# Patient Record
Sex: FEMALE | Race: WHITE | Employment: FULL TIME | URBAN - METROPOLITAN AREA
[De-identification: names, ages, dates, MRNs, and addresses within clinical notes are randomized per-mention and may not be internally consistent; named-entity substitution may affect disease eponyms.]

---
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
## 2025-05-19 ENCOUNTER — OFFICE VISIT (OUTPATIENT)
Dept: ORTHOPEDIC SURGERY | Age: 20
End: 2025-05-19
Payer: COMMERCIAL

## 2025-05-19 VITALS — HEIGHT: 64 IN | BODY MASS INDEX: 22.71 KG/M2 | WEIGHT: 133 LBS

## 2025-05-19 DIAGNOSIS — M79.661 PAIN IN RIGHT SHIN: Primary | ICD-10-CM

## 2025-05-19 PROCEDURE — 99214 OFFICE O/P EST MOD 30 MIN: CPT | Performed by: ORTHOPAEDIC SURGERY

## 2025-05-19 NOTE — PROGRESS NOTES
Sommer Rivera is seen today for right leg pain.  I saw her for a left tibial stress fracture in 2022.  She had a right tibial stress injury treated by Dr. Forman in 2023.      Today she reports that she had no trouble in cross-country season of 2024.  The beginning of April of this year during track season she began having right shin pain again.  She has been in a boot for the last 2 weeks and that has helped but she can get pain that still 4 out of 10.    She just finished her sophomore year at [employer].      History: Patient's relevant past family, medical, and social history are reviewed as part of today's visit. ROS of pertinent positives and negatives as above; otherwise negative.    General Exam:    Vitals: Height 1.626 m (5' 4\"), weight 60.3 kg (133 lb).  Constitutional: Patient is adequately groomed with no evidence of malnutrition  Mental Status: The patient is oriented to time, place and person.  The patient's mood and affect are appropriate.  Gait:  Patient walks with normal gait and station.  Lymphatic: The lymphatic examination bilaterally reveals all areas to be without enlargement or induration.  Vascular: Examination reveals no swelling or calf tenderness.  Peripheral pulses are palpable and 2+.  Neurological: The patient has good coordination.  There is no weakness or sensory deficit.    Skin:    Head/Neck: inspection reveals no rashes, ulcerations or lesions.  Trunk:  inspection reveals no rashes, ulcerations or lesions.  Right Lower Extremity: inspection reveals no rashes, ulcerations or lesions.  Left Lower Extremity: inspection reveals no rashes, ulcerations or lesions.    Left shin is normal.    She has moderate to intense pain anterior medial at the midshaft of the tibia.  She has no fibular pain.  Calf is soft.      She has no pain with range of motion at the ankle.    2 view x-rays right shin obtained today in the office and interpreted by me demonstrate: No acute bony

## 2025-06-03 ENCOUNTER — OFFICE VISIT (OUTPATIENT)
Dept: ORTHOPEDIC SURGERY | Age: 20
End: 2025-06-03
Payer: COMMERCIAL

## 2025-06-03 VITALS — WEIGHT: 133 LBS | HEIGHT: 64 IN | BODY MASS INDEX: 22.71 KG/M2

## 2025-06-03 DIAGNOSIS — M79.661 PAIN IN RIGHT SHIN: Primary | ICD-10-CM

## 2025-06-03 PROCEDURE — 99212 OFFICE O/P EST SF 10 MIN: CPT | Performed by: ORTHOPAEDIC SURGERY

## 2025-06-03 NOTE — PROGRESS NOTES
Sommer Rivera is seen today to follow-up her right leg.  She has been in the boot.  We started calcium and vitamin D.  Today she reports significant improvement.  If she has any pain is only 1 out of 10.    General Exam:    Vitals: Height 1.626 m (5' 4\"), weight 60.3 kg (133 lb).  Constitutional: Patient is adequately groomed with no evidence of malnutrition  Mental Status: The patient is oriented to time, place and person.  The patient's mood and affect are appropriate.    Right shin has no posterior medial pain but some mild pain anteriorly over the tibia.  She has no swelling.  Calf is soft.  Neurologic and vascular exams are normal.    Assessment: Improving right tibial stress injury versus shinsplints.    Plan: She will begin to wean from the boot.  Will start therapy with consideration of dry needling and use of the antigravity treadmill to allow her to get back to full training.  Follow-up with me in 2 weeks.

## 2025-06-09 ENCOUNTER — HOSPITAL ENCOUNTER (OUTPATIENT)
Dept: PHYSICAL THERAPY | Age: 20
Setting detail: THERAPIES SERIES
Discharge: HOME | End: 2025-06-09
Attending: ORTHOPAEDIC SURGERY
Payer: COMMERCIAL

## 2025-06-09 DIAGNOSIS — M79.661 PAIN IN RIGHT SHIN: Primary | ICD-10-CM

## 2025-06-09 PROCEDURE — 97110 THERAPEUTIC EXERCISES: CPT

## 2025-06-09 PROCEDURE — 97112 NEUROMUSCULAR REEDUCATION: CPT

## 2025-06-09 PROCEDURE — 97161 PT EVAL LOW COMPLEX 20 MIN: CPT

## 2025-06-09 NOTE — PLAN OF CARE
n/a  Other:    Red Flags:  None    Suicide Screening:   The patient did not verbalize a primary behavioral concern, suicidal ideation, suicidal intent, or demonstrate suicidal behaviors.    Preferred Language for Healthcare:   [x] English       [] other:    SUBJECTIVE EXAMINATION     Patient stated complaint: R shin pain. In April, she noticed shin pain begin after running and stayed pretty constant. She followed up with [employer] at school after the season ended and she was placed in a boot. She followed up with MD when she came in town from college. She does not use her boot while in her home, but uses it any time she leaves home.   She has a history of two stress injuries/fractures, which took her at least a couple of months to return from.    At college, she participates in cross county and track (800m, 1500m, and 5k)       Chart review:  right leg pain.  I saw her for a left tibial stress fracture in 2022.  She had a right tibial stress injury treated by Dr. Forman in 2023. Today she reports that she had no trouble in cross-country season of 2024.  The beginning of April of this year during [employer] season she began having right shin pain again.  She has been in a boot for the last 2 weeks and that has helped but she can get pain that still 4 out of 10. She will continue with her boot. She will start calcium with vitamin D supplement and we made a recommendation about that. Follow-up with me in 2 weeks. Assessment: Improving right tibial stress injury versus shinsplints. Plan: She will begin to wean from the boot.  Will start therapy with consideration of dry needling and use of the antigravity treadmill to allow her to get back to full training.  Follow-up with me in 2 weeks.       Test used Initial score  6/9/25 06/09/2025   Pain Summary VAS 4/10 0/10   Functional questionnaire LEFS 35% Deficit     Other:              Pain:  Pain location: R tibia   Patient describes pain to be aching  Pain decreases with: Resting and

## 2025-06-12 ENCOUNTER — HOSPITAL ENCOUNTER (OUTPATIENT)
Dept: PHYSICAL THERAPY | Age: 20
Setting detail: THERAPIES SERIES
Discharge: HOME | End: 2025-06-12
Attending: ORTHOPAEDIC SURGERY
Payer: COMMERCIAL

## 2025-06-12 PROCEDURE — 97110 THERAPEUTIC EXERCISES: CPT

## 2025-06-12 PROCEDURE — 97112 NEUROMUSCULAR REEDUCATION: CPT

## 2025-06-12 NOTE — FLOWSHEET NOTE
or restriction.   [] Progressing: [] Met: [] Not Met: [] Adjusted  Patient will be able to jog 20 minutes without increased symptoms or restriction.   [] Progressing: [] Met: [] Not Met: [] Adjusted       Overall Progression Towards Functional goals/ Treatment Progress Update:  [] Patient is progressing as expected towards functional goals listed.    [] Progression is slowed due to complexities/Impairments listed.  [] Progression has been slowed due to co-morbidities.  [x] Plan just implemented, too soon (<30days) to assess goals progression   [] Goals require adjustment due to lack of progress  [] Patient is not progressing as expected and requires additional follow up with physician  [] Other:     TREATMENT PLAN     Frequency/Duration: 1-2x/week for  6-8  weeks for the following treatment interventions:    Interventions:  Therapeutic Exercise (06985) including: strength training, ROM, and functional mobility  Therapeutic Activities (83751) including: functional mobility training and education.  Neuromuscular Re-education (29681) activation and proprioception, including postural re-education.    Manual Therapy (58576) as indicated to include: Passive Range of Motion, Gr I-IV mobilizations, Soft Tissue Mobilization, Dry Needling/IASTM, Trigger Point Release, and Myofascial Release  Modalities as needed that may include: Cryotherapy and Electrical Stimulation  Patient education on joint protection, postural re-education, activity modification, and progression of HEP    Plan: POC initiated as per evaluation    Electronically Signed by Beatriz Royal PT  Date: 06/12/2025   Note: Portions of this note have been templated and/or copied from initial evaluation, reassessments and prior notes for documentation efficiency.  Note: If patient does not return for scheduled/recommended follow up visits, this note will serve as a discharge from care along with the most recent update on progress.

## 2025-06-16 ENCOUNTER — HOSPITAL ENCOUNTER (OUTPATIENT)
Dept: PHYSICAL THERAPY | Age: 20
Setting detail: THERAPIES SERIES
Discharge: HOME | End: 2025-06-16
Attending: ORTHOPAEDIC SURGERY
Payer: COMMERCIAL

## 2025-06-16 PROCEDURE — 97112 NEUROMUSCULAR REEDUCATION: CPT

## 2025-06-16 PROCEDURE — 97110 THERAPEUTIC EXERCISES: CPT

## 2025-06-16 PROCEDURE — 97530 THERAPEUTIC ACTIVITIES: CPT

## 2025-06-16 NOTE — FLOWSHEET NOTE
[facility] - Outpatient Rehabilitation and Therapy: [address], [place] [address] office: [phone] fax: [phone]       Physical Therapy: TREATMENT/PROGRESS NOTE   Patient: Sommer Rivera (19 y.o. female)   Examination Date: 2025   :  2005 MRN: [id number]   Visit #: 3   Insurance Allowable Auth Needed   60 VPCY []Yes    [x]No    Insurance: Payor: [place] BC[place] / Plan: [place] / Product Type: *No Product type* /   Insurance ID: [id number] - ([place] BCBS)  Secondary Insurance (if applicable): [employer] CLAIM SERVICES   Treatment Diagnosis:     ICD-10-CM    1. Pain in right shin  M79.661          Medical Diagnosis:  Pain in right shin [M79.661]   Referring Physician: Andrés Arellano MD  PCP: [facility] [place] (Inactive)     Plan of care signed (Y/N): Y    Date of Patient follow up with Physician:      Plan of Care Report: EVAL   POC update due: (10 visits /OR AUTH LIMITS, whichever is less)  2025                                             Medical History:  Comorbidities / Relevant Medical History: hx of BLE stress injuries                                          Precautions/ Contra-indications:           Latex allergy:  NO  Pacemaker:    NO  Contraindications for Manipulation: None  Date of Surgery: n/a  Other:    Red Flags:  None    Suicide Screening:   The patient did not verbalize a primary behavioral concern, suicidal ideation, suicidal intent, or demonstrate suicidal behaviors.    Preferred Language for Healthcare:   [x] English       [] other:    SUBJECTIVE EXAMINATION     Patient stated complaint: Patient states the shin feels fine, no adverse effects after the last session. She continues to wear a gym shoe for community ambulation with no presentation of symptoms.        Test used Initial score  2025   Pain Summary VAS 4/10 0/10   Functional questionnaire LEFS 35% Deficit     Other:              OBJECTIVE EXAMINATION

## 2025-06-19 ENCOUNTER — HOSPITAL ENCOUNTER (OUTPATIENT)
Dept: PHYSICAL THERAPY | Age: 20
Setting detail: THERAPIES SERIES
Discharge: HOME | End: 2025-06-19
Attending: ORTHOPAEDIC SURGERY
Payer: COMMERCIAL

## 2025-06-19 ENCOUNTER — OFFICE VISIT (OUTPATIENT)
Dept: ORTHOPEDIC SURGERY | Age: 20
End: 2025-06-19
Payer: COMMERCIAL

## 2025-06-19 ENCOUNTER — HOSPITAL ENCOUNTER (OUTPATIENT)
Dept: PHYSICAL THERAPY | Age: 20
Discharge: HOME | End: 2025-06-19

## 2025-06-19 VITALS — BODY MASS INDEX: 22.71 KG/M2 | WEIGHT: 133 LBS | RESPIRATION RATE: 12 BRPM | HEIGHT: 64 IN

## 2025-06-19 DIAGNOSIS — M79.661 PAIN IN RIGHT SHIN: Primary | ICD-10-CM

## 2025-06-19 PROCEDURE — 97112 NEUROMUSCULAR REEDUCATION: CPT

## 2025-06-19 PROCEDURE — 97110 THERAPEUTIC EXERCISES: CPT

## 2025-06-19 PROCEDURE — 9990000112

## 2025-06-19 PROCEDURE — 99212 OFFICE O/P EST SF 10 MIN: CPT | Performed by: ORTHOPAEDIC SURGERY

## 2025-06-19 PROCEDURE — 97530 THERAPEUTIC ACTIVITIES: CPT

## 2025-06-19 NOTE — PROGRESS NOTES
Sommer Rivera returns today to follow-up her right shin.  She has been in therapy.  At her last therapy visit she reported that she was doing well.    Today she says she had some soreness after therapy but is making progress.  She is very concerned about returning to run.  She is going on vacation next week.    She has been referred to the [facility] because of recurrent stress injuries.      General Exam:    Vitals: Resp. rate 12, height 1.626 m (5' 4\"), weight 60.3 kg (133 lb).  Constitutional: Patient is adequately groomed with no evidence of malnutrition  Mental Status: The patient is oriented to time, place and person.  The patient's mood and affect are appropriate.    Right tibia has no tenderness today.  She walks with a normal gait.  There is no swelling.    Assessment: Resolving right tibial stress injury.    Plan: Continue with PT and progressing on the antigravity treadmill.  Follow-up with me for any issues.

## 2025-06-19 NOTE — FLOWSHEET NOTE
w/ her cycle.    Relative Energy Deficiency in Sport (RED-S) Risk:  []Satisfactory   [x]Needs further Intervention   Notes: Score of 14 on LEAF-Q indicating RED-S diagnosis. Referral to a PCP and GYN to begin a full workup.    Cardiovascular Fitness Score:   []Satisfactory   []Needs further Intervention   Notes: Untested    Mobility Score:  []Satisfactory   [x]Needs further Intervention   Notes: SEE PT EVAL    Strength Score:  []Satisfactory   [x]Needs further Intervention   Notes: SEE PT EVAL    Neuromuscular Control:   []Satisfactory   []Needs further Intervention   Notes: Untested, potential for running gait analysis in the future as she begins progressing back to running.    __________________________________________________________________________________________    Referrals:  Immediate referrals:  PCP w/ Dr. Re Quezada  GYN w/ Dr. Rachna Bang     Follow up referrals:  Sports dietetics: [facility]za  Psych for anxiety      Recommendations:   A full 90 minutes of her appointment time was spent discussing and educating Sommer on RED - S and the influence of her gut and hormonal health on bone mineral density and her injury risk factors. We discussed the importance of establishing care with a primary care physician to inquire into lab work to identify any underlying contributing factors to her bone health, such as vitamin D, calcium, thyroid function, hormonal function, etc. we also discussed the need to establish care with an GYN physician in order to start to treat her syncope and vomiting with her menstrual cycle.    Sommer is agreeable to all these treatment plans with establishing care with a PCP and gynecologist. We also discussed the need for secondary intervention with a nutritionist and potentially mental health professional to add to her holistic treatment plan. I will remain in contact with her medial team to help facilitate referrals and treatment options.     -----      Thank you for taking the

## 2025-06-19 NOTE — FLOWSHEET NOTE
[facility] - Outpatient Rehabilitation and Therapy: [address], [place] [address] office: [phone] fax: [phone]       Physical Therapy: TREATMENT/PROGRESS NOTE   Patient: Sommer Rivera (19 y.o. female)   Examination Date: 2025   :  2005 MRN: [id number]   Visit #: 4   Insurance Allowable Auth Needed   60 VPCY []Yes    [x]No    Insurance: Pay[place] / Plan[place] / Product Type: *No Product type* /   Insurance ID: [id number] - ([place] BCBS)  Secondary Insurance (if applicable): [employer] CLAIM SERVICES   Treatment Diagnosis:     ICD-10-CM    1. Pain in right shin  M79.661          Medical Diagnosis:  Pain in right shin [M79.661]   Referring Physician: Andrés Arellano MD  PCP: [facility] [place] (Inactive)     Plan of care signed (Y/N): Y    Date of Patient follow up with Physician:      Plan of Care Report: EVAL   POC update due: (10 visits /OR AUTH LIMITS, whichever is less)  2025                                             Medical History:  Comorbidities / Relevant Medical History: hx of BLE stress injuries                                          Precautions/ Contra-indications:           Latex allergy:  NO  Pacemaker:    NO  Contraindications for Manipulation: None  Date of Surgery: n/a  Other:    Red Flags:  None    Suicide Screening:   The patient did not verbalize a primary behavioral concern, suicidal ideation, suicidal intent, or demonstrate suicidal behaviors.    Preferred Language for Healthcare:   [x] English       [] other:    SUBJECTIVE EXAMINATION     Patient stated complaint: Patient reports minimal pain in the shin after the last session, lingering into the next day.   This morning, she did begin the [facility] today. She was instructed to follow up with PCP regarding a possible vitamin D deficiency. Patient is not currently schedule a follow up in this program, but will be in touch with the provider

## 2025-06-30 ENCOUNTER — HOSPITAL ENCOUNTER (OUTPATIENT)
Dept: PHYSICAL THERAPY | Age: 20
Setting detail: THERAPIES SERIES
Discharge: HOME | End: 2025-06-30
Attending: ORTHOPAEDIC SURGERY
Payer: COMMERCIAL

## 2025-06-30 PROCEDURE — 97112 NEUROMUSCULAR REEDUCATION: CPT

## 2025-06-30 PROCEDURE — 97140 MANUAL THERAPY 1/> REGIONS: CPT

## 2025-06-30 PROCEDURE — 97110 THERAPEUTIC EXERCISES: CPT

## 2025-06-30 NOTE — FLOWSHEET NOTE
[facility] - Outpatient Rehabilitation and Therapy: [address], [place] [address] office: [phone] fax: [phone]       Physical Therapy: TREATMENT/PROGRESS NOTE   Patient: Sommer Rivera (19 y.o. female)   Examination Date: 2025   :  2005 MRN: [id number]   Visit #: 5   Insurance Allowable Auth Needed   60 VPCY []Yes    [x]No    Insurance: [place] / [place] / Product Type: *No Product type* /   Insurance ID: [id number] - ([place] BCBS)  Secondary Insurance (if applicable): [email or web address] CLAIM SERVICES   Treatment Diagnosis:     ICD-10-CM    1. Pain in right shin  M79.661          Medical Diagnosis:  Pain in right shin [M79.661]   Referring Physician: Andrés Arellano MD  PCP: [facility] [place] (Inactive)     Plan of care signed (Y/N): Y    Date of Patient follow up with Physician:      Plan of Care Report: EVAL   POC update due: (10 visits /OR AUTH LIMITS, whichever is less)  2025                                             Medical History:  Comorbidities / Relevant Medical History: hx of BLE stress injuries                                          Precautions/ Contra-indications:           Latex allergy:  NO  Pacemaker:    NO  Contraindications for Manipulation: None  Date of Surgery: n/a  Other:    Red Flags:  None    Suicide Screening:   The patient did not verbalize a primary behavioral concern, suicidal ideation, suicidal intent, or demonstrate suicidal behaviors.    Preferred Language for Healthcare:   [x] English       [] other:    SUBJECTIVE EXAMINATION     Patient stated complaint: Patient denies recent shin pain. She used the elliptical while OOT and had no issues during or afterwards.        Test used Initial score  2025   Pain Summary VAS 4/10 0/10   Functional questionnaire LEFS 35% Deficit     Other:              OBJECTIVE EXAMINATION     25  ROM LEFT RIGHT        Knee ext     Knee Flex     DF knee bent

## 2025-07-01 ENCOUNTER — OFFICE VISIT (OUTPATIENT)
Dept: PRIMARY CARE CLINIC | Age: 20
End: 2025-07-01
Payer: COMMERCIAL

## 2025-07-01 VITALS
HEIGHT: 65 IN | BODY MASS INDEX: 21.52 KG/M2 | OXYGEN SATURATION: 99 % | SYSTOLIC BLOOD PRESSURE: 109 MMHG | HEART RATE: 65 BPM | DIASTOLIC BLOOD PRESSURE: 66 MMHG | WEIGHT: 129.2 LBS

## 2025-07-01 DIAGNOSIS — R53.83 OTHER FATIGUE: ICD-10-CM

## 2025-07-01 DIAGNOSIS — N95.9 AT RISK FOR OSTEOPOROSIS IN PREMENOPAUSAL PATIENT: ICD-10-CM

## 2025-07-01 DIAGNOSIS — M84.361S STRESS FRACTURE OF RIGHT TIBIA, SEQUELA: ICD-10-CM

## 2025-07-01 DIAGNOSIS — Z91.89 AT RISK FOR OSTEOPOROSIS IN PREMENOPAUSAL PATIENT: ICD-10-CM

## 2025-07-01 DIAGNOSIS — N92.6 IRREGULAR MENSES: ICD-10-CM

## 2025-07-01 DIAGNOSIS — M84.361S STRESS FRACTURE OF RIGHT TIBIA, SEQUELA: Primary | ICD-10-CM

## 2025-07-01 PROBLEM — M79.604 RIGHT LEG PAIN: Status: RESOLVED | Noted: 2023-07-29 | Resolved: 2025-07-01

## 2025-07-01 LAB
25(OH)D3 SERPL-MCNC: 38 NG/ML
ALBUMIN SERPL-MCNC: 4.7 G/DL (ref 3.4–5)
ALBUMIN/GLOB SERPL: 2.1 {RATIO} (ref 1.1–2.2)
ALP SERPL-CCNC: 51 U/L (ref 40–129)
ALT SERPL-CCNC: 19 U/L (ref 10–40)
ANION GAP SERPL CALCULATED.3IONS-SCNC: 12 MMOL/L (ref 3–16)
AST SERPL-CCNC: 31 U/L (ref 15–37)
BASOPHILS # BLD: 0 K/UL (ref 0–0.2)
BASOPHILS NFR BLD: 0.8 %
BILIRUB SERPL-MCNC: 0.6 MG/DL (ref 0–1)
BUN SERPL-MCNC: 20 MG/DL (ref 7–20)
CALCIUM SERPL-MCNC: 9.8 MG/DL (ref 8.3–10.6)
CHLORIDE SERPL-SCNC: 104 MMOL/L (ref 99–110)
CO2 SERPL-SCNC: 23 MMOL/L (ref 21–32)
CREAT SERPL-MCNC: 0.8 MG/DL (ref 0.6–1.1)
DEPRECATED RDW RBC AUTO: 12.6 % (ref 12.4–15.4)
EOSINOPHIL # BLD: 0.1 K/UL (ref 0–0.6)
EOSINOPHIL NFR BLD: 1.4 %
FERRITIN SERPL IA-MCNC: 14.2 NG/ML (ref 15–150)
FOLATE SERPL-MCNC: 11.7 NG/ML (ref 4.78–24.2)
GFR SERPLBLD CREATININE-BSD FMLA CKD-EPI: >90 ML/MIN/{1.73_M2}
GLUCOSE SERPL-MCNC: 80 MG/DL (ref 70–99)
HCT VFR BLD AUTO: 39.3 % (ref 36–48)
HGB BLD-MCNC: 13.6 G/DL (ref 12–16)
IRON SATN MFR SERPL: 38 % (ref 15–50)
IRON SERPL-MCNC: 138 UG/DL (ref 37–145)
LYMPHOCYTES # BLD: 1.2 K/UL (ref 1–5.1)
LYMPHOCYTES NFR BLD: 21.1 %
MCH RBC QN AUTO: 31.5 PG (ref 26–34)
MCHC RBC AUTO-ENTMCNC: 34.7 G/DL (ref 31–36)
MCV RBC AUTO: 91 FL (ref 80–100)
MONOCYTES # BLD: 0.3 K/UL (ref 0–1.3)
MONOCYTES NFR BLD: 4.7 %
NEUTROPHILS # BLD: 4.1 K/UL (ref 1.7–7.7)
NEUTROPHILS NFR BLD: 72 %
PLATELET # BLD AUTO: 223 K/UL (ref 135–450)
PMV BLD AUTO: 9.4 FL (ref 5–10.5)
POTASSIUM SERPL-SCNC: 4.5 MMOL/L (ref 3.5–5.1)
PROT SERPL-MCNC: 6.9 G/DL (ref 6.4–8.2)
RBC # BLD AUTO: 4.31 M/UL (ref 4–5.2)
SODIUM SERPL-SCNC: 139 MMOL/L (ref 136–145)
TIBC SERPL-MCNC: 367 UG/DL (ref 260–445)
TSH SERPL DL<=0.005 MIU/L-ACNC: 1.4 UIU/ML (ref 0.27–4.2)
VIT B12 SERPL-MCNC: 406 PG/ML (ref 211–911)
WBC # BLD AUTO: 5.6 K/UL (ref 4–11)

## 2025-07-01 PROCEDURE — 99203 OFFICE O/P NEW LOW 30 MIN: CPT | Performed by: FAMILY MEDICINE

## 2025-07-01 SDOH — ECONOMIC STABILITY: FOOD INSECURITY: WITHIN THE PAST 12 MONTHS, YOU WORRIED THAT YOUR FOOD WOULD RUN OUT BEFORE YOU GOT MONEY TO BUY MORE.: NEVER TRUE

## 2025-07-01 SDOH — ECONOMIC STABILITY: FOOD INSECURITY: WITHIN THE PAST 12 MONTHS, THE FOOD YOU BOUGHT JUST DIDN'T LAST AND YOU DIDN'T HAVE MONEY TO GET MORE.: NEVER TRUE

## 2025-07-01 ASSESSMENT — PATIENT HEALTH QUESTIONNAIRE - PHQ9
SUM OF ALL RESPONSES TO PHQ QUESTIONS 1-9: 0
1. LITTLE INTEREST OR PLEASURE IN DOING THINGS: NOT AT ALL
2. FEELING DOWN, DEPRESSED OR HOPELESS: NOT AT ALL
SUM OF ALL RESPONSES TO PHQ QUESTIONS 1-9: 0

## 2025-07-01 NOTE — PROGRESS NOTES
Additionally she reports about four times a year, upon the beginning of her menstrual cycle, she experiences, vomiting and extreme pain that causes her to lose consciousness and pass out. She has never been under the care of a  PCP or OB/GYN as an adult and has never been on any hormonal contraceptives or brought this symptomatology up to any physicians in the past.     She reports feeling fatigued in her day to day life. She reports sleeping about 6.5-8 hours per night but that she often wakes about 3-5x a night.      Nutritionally Sommer reports that she eats three full meals a day plus snacks and has no history of restrictive eating patterns. We did discuss that blank the influence of inadvertent under fueling and its affects on bone mineral density and stress fractures. She has worked with a sports dietitian back in high school, but only had one visit with this Professional, which didn't amount to any follow ups.    PHQ-9 Total Score: 0 (7/1/2025  9:37 AM)       Prior to Visit Medications    Medication Sig Taking? Authorizing Provider   Ferrous Sulfate (IRON) 28 MG TABS Take by mouth  Patient not taking: Reported on 5/19/2025  Shireen Cheung MD   meloxicam (MOBIC) 15 MG tablet Take 1 tablet by mouth daily  Patient not taking: Reported on 5/19/2025  Naif Forman MD   Cholecalciferol (VITAMIN D) 50 MCG (2000 UT) CAPS capsule Take by mouth  Patient not taking: Reported on 5/19/2025  Shireen Cheung MD   calcium carbonate (OSCAL) 500 MG TABS tablet Take 1 tablet by mouth daily  Patient not taking: Reported on 5/19/2025  Shireen Cheung MD   cetirizine (ZYRTEC) 10 MG tablet   Shireen Cheung MD   fluticasone (FLONASE) 50 MCG/ACT nasal spray by Nasal route  Shireen Cheung MD        No past medical history on file.     Social History     Tobacco Use    Smoking status: Never    Smokeless tobacco: Never   Substance Use Topics    Alcohol use: Yes     Comment: social    Drug use: Never

## 2025-07-02 ENCOUNTER — HOSPITAL ENCOUNTER (OUTPATIENT)
Dept: PHYSICAL THERAPY | Age: 20
Setting detail: THERAPIES SERIES
Discharge: HOME | End: 2025-07-02
Attending: ORTHOPAEDIC SURGERY
Payer: COMMERCIAL

## 2025-07-02 ENCOUNTER — RESULTS FOLLOW-UP (OUTPATIENT)
Dept: PRIMARY CARE CLINIC | Age: 20
End: 2025-07-02

## 2025-07-02 ENCOUNTER — APPOINTMENT (OUTPATIENT)
Dept: PHYSICAL THERAPY | Age: 20
End: 2025-07-02
Attending: ORTHOPAEDIC SURGERY
Payer: COMMERCIAL

## 2025-07-02 PROCEDURE — 97112 NEUROMUSCULAR REEDUCATION: CPT | Performed by: PHYSICAL THERAPIST

## 2025-07-02 PROCEDURE — 97140 MANUAL THERAPY 1/> REGIONS: CPT | Performed by: PHYSICAL THERAPIST

## 2025-07-02 PROCEDURE — 97110 THERAPEUTIC EXERCISES: CPT | Performed by: PHYSICAL THERAPIST

## 2025-07-02 PROCEDURE — 97530 THERAPEUTIC ACTIVITIES: CPT | Performed by: PHYSICAL THERAPIST

## 2025-07-02 NOTE — FLOWSHEET NOTE
[facility] - Outpatient Rehabilitation and Therapy: [address] [place] [address] office: [phone] fax: [phone]       Physical Therapy: TREATMENT/PROGRESS NOTE   Patient: Sommer Rivera (19 y.o. female)   Examination Date: 2025   :  2005 MRN: [id number]   Visit #: 6   Insurance Allowable Auth Needed   60 VPCY []Yes    [x]No    Insurance: [place] / Plan[place] / Product Type: *No Product type* /   Insurance ID: [id number] - ([place] BCBS)  Secondary Insurance (if applicable): [employer] CLAIM SERVICES   Treatment Diagnosis:     ICD-10-CM    1. Pain in right shin  M79.661          Medical Diagnosis:  Pain in right shin [M79.661]   Referring Physician: Andrés Arellano MD  PCP: Re Quezada DO     Plan of care signed (Y/N): Y    Date of Patient follow up with Physician:      Plan of Care Report: EVAL   POC update due: (10 visits /OR AUTH LIMITS, whichever is less)  2025                                             Medical History:  Comorbidities / Relevant Medical History: hx of BLE stress injuries                                          Precautions/ Contra-indications:           Latex allergy:  NO  Pacemaker:    NO  Contraindications for Manipulation: None  Date of Surgery: n/a  Other:    Red Flags:  None    Suicide Screening:   The patient did not verbalize a primary behavioral concern, suicidal ideation, suicidal intent, or demonstrate suicidal behaviors.    Preferred Language for Healthcare:   [x] English       [] other:    SUBJECTIVE EXAMINATION     Patient stated complaint: She has been working as a , and she has not had any pain.  She has been doing the elliptical without any symptoms.  She does this about every day for about 1-2 hrs.  She is trying to replicate her mileage on the elliptical.  She has had no pain with this.      She felt the IASTM made her tight afterwards, but she rolled out and was fine.         Test used

## 2025-07-08 ENCOUNTER — HOSPITAL ENCOUNTER (OUTPATIENT)
Dept: PHYSICAL THERAPY | Age: 20
Setting detail: THERAPIES SERIES
Discharge: HOME | End: 2025-07-08
Attending: ORTHOPAEDIC SURGERY
Payer: COMMERCIAL

## 2025-07-08 PROCEDURE — 97112 NEUROMUSCULAR REEDUCATION: CPT

## 2025-07-08 PROCEDURE — 97530 THERAPEUTIC ACTIVITIES: CPT

## 2025-07-08 PROCEDURE — 97110 THERAPEUTIC EXERCISES: CPT

## 2025-07-08 PROCEDURE — 97140 MANUAL THERAPY 1/> REGIONS: CPT

## 2025-07-08 NOTE — FLOWSHEET NOTE
[facility] - Outpatient Rehabilitation and Therapy: [address] [place] [address] office: [phone] fax: [phone]       Physical Therapy: TREATMENT/PROGRESS NOTE   Patient: Sommer Rivera (19 y.o. female)   Examination Date: 2025   :  2005 MRN: [id number]   Visit #: 7   Insurance Allowable Auth Needed   60 VPCY []Yes    [x]No    Insurance: Pay[place] / Plan[place] / Product Type: *No Product type* /   Insurance ID: [id number] - ([place] BCBS)  Secondary Insurance (if applicable): [employer] CLAIM SERVICES   Treatment Diagnosis:     ICD-10-CM    1. Pain in right shin  M79.661          Medical Diagnosis:  Pain in right shin [M79.661]   Referring Physician: Andrés Arellano MD  PCP: Re Quezada DO     Plan of care signed (Y/N): Y    Date of Patient follow up with Physician:      Plan of Care Report: EVAL   POC update due: (10 visits /OR AUTH LIMITS, whichever is less)  2025                                             Medical History:  Comorbidities / Relevant Medical History: hx of BLE stress injuries                                          Precautions/ Contra-indications:           Latex allergy:  NO  Pacemaker:    NO  Contraindications for Manipulation: None  Date of Surgery: n/a  Other:    Red Flags:  None    Suicide Screening:   The patient did not verbalize a primary behavioral concern, suicidal ideation, suicidal intent, or demonstrate suicidal behaviors.    Preferred Language for Healthcare:   [x] English       [] other:    SUBJECTIVE EXAMINATION     Patient stated complaint: pt notes she had a bit of post workout soreness after last session but this resolved quickly.        Test used Initial score  2025   Pain Summary VAS 4/10 0/10   Functional questionnaire LEFS 35% Deficit     Other:              OBJECTIVE EXAMINATION     25  ROM LEFT RIGHT        Knee ext     Knee Flex     DF knee bent     DF knee straight     Ankle

## 2025-07-11 ENCOUNTER — HOSPITAL ENCOUNTER (OUTPATIENT)
Dept: PHYSICAL THERAPY | Age: 20
Setting detail: THERAPIES SERIES
Discharge: HOME | End: 2025-07-11
Attending: ORTHOPAEDIC SURGERY
Payer: COMMERCIAL

## 2025-07-11 PROCEDURE — 97112 NEUROMUSCULAR REEDUCATION: CPT

## 2025-07-11 PROCEDURE — 97110 THERAPEUTIC EXERCISES: CPT

## 2025-07-11 PROCEDURE — 97530 THERAPEUTIC ACTIVITIES: CPT

## 2025-07-11 PROCEDURE — 97140 MANUAL THERAPY 1/> REGIONS: CPT

## 2025-07-11 NOTE — FLOWSHEET NOTE
been templated and/or copied from initial evaluation, reassessments and prior notes for documentation efficiency.  Note: If patient does not return for scheduled/recommended follow up visits, this note will serve as a discharge from care along with the most recent update on progress.

## 2025-07-15 ENCOUNTER — HOSPITAL ENCOUNTER (OUTPATIENT)
Dept: PHYSICAL THERAPY | Age: 20
Setting detail: THERAPIES SERIES
Discharge: HOME | End: 2025-07-15
Attending: ORTHOPAEDIC SURGERY
Payer: COMMERCIAL

## 2025-07-15 PROCEDURE — 97110 THERAPEUTIC EXERCISES: CPT

## 2025-07-15 PROCEDURE — 97112 NEUROMUSCULAR REEDUCATION: CPT

## 2025-07-15 PROCEDURE — 97530 THERAPEUTIC ACTIVITIES: CPT

## 2025-07-15 PROCEDURE — 97140 MANUAL THERAPY 1/> REGIONS: CPT

## 2025-07-15 NOTE — PLAN OF CARE
[facility] - Outpatient Rehabilitation and Therapy: [address] [place] [address] office: [phone] fax: [phone]  Physical Therapy Re-Certification Plan of Care/MD UPDATE      Dear  Dr. Arellano    We had the pleasure of treating the following patient for physical therapy services at [facility].  A summary of our findings can be found in the updated assessment below.  This includes our plan of care.  If you have any questions or concerns regarding these findings, please do not hesitate to contact me at the office phone number checked above.  Thank you for the referral.     Physician Signature:________________________________Date:__________________  By signing above (or electronic signature), therapist’s plan is approved by physician    Date Range Of Visits: 6/09/25-7/15/25  Total Visits to Date: 9  Overall Response to Treatment:   []Patient is responding well to treatment and improvement is noted with regards  to goals   []Patient should continue to improve in reasonable time if they continue HEP   []Patient has plateaued and is no longer responding to skilled PT intervention    []Patient is getting worse and would benefit from return to referring MD   []Patient unable to adhere to initial POC   []Other: pt shows improved DF but does still exhibit some limitation in soleus> gastroc on right side. She has been steadily progressing with weightbearing on AlterG without pain but did note some discomfort/tightness after doing lower body workout followed by elliptical. She does feel that this discomfort is different than she was previously feeling. We have discussed DN in next session to improve mobility of gastroc/soleus in order to reduce strain to anterior shin. Pt would continue to benefit from skilled intervention in order to improve mobility and return to PLOF with collegiate running, 2x per week for 3 weeks until transfer of care back to college [employer].       Physical

## 2025-07-16 ENCOUNTER — TELEPHONE (OUTPATIENT)
Dept: PRIMARY CARE CLINIC | Age: 20
End: 2025-07-16

## 2025-07-16 NOTE — TELEPHONE ENCOUNTER
Per  patient informed of the following message regarding results     Sommer,      Your lab results overall look good. Your iron level was a little on the low side. I would suggest a daily multivitamin with iron and vitamin D in it. I will keep you posted once the bone density scan is back.

## 2025-07-18 ENCOUNTER — HOSPITAL ENCOUNTER (OUTPATIENT)
Dept: PHYSICAL THERAPY | Age: 20
Setting detail: THERAPIES SERIES
Discharge: HOME | End: 2025-07-18
Attending: ORTHOPAEDIC SURGERY
Payer: COMMERCIAL

## 2025-07-18 PROCEDURE — 20560 NDL INSJ W/O NJX 1 OR 2 MUSC: CPT

## 2025-07-18 PROCEDURE — 97110 THERAPEUTIC EXERCISES: CPT

## 2025-07-18 PROCEDURE — 97530 THERAPEUTIC ACTIVITIES: CPT

## 2025-07-18 NOTE — FLOWSHEET NOTE
[facility] - Outpatient Rehabilitation and Therapy: [facility] [place] [address] office: [phone] fax: [phone]      Physical Therapy: TREATMENT/PROGRESS NOTE   Patient: Sommer Rivera (19 y.o. female)   Examination Date: 2025   :  2005 MRN: [id number]   Visit #: 10   Insurance Allowable Auth Needed   60 VPCY []Yes    [x]No    Insurance: Pay[place] / Plan[place] / Product Type: *No Product type* /   Insurance ID: [id number] - ([place] BCBS)  Secondary Insurance (if applicable): [employer] CLAIM SERVICES   Treatment Diagnosis:     ICD-10-CM    1. Pain in right shin  M79.661          Medical Diagnosis:  Pain in right shin [M79.661]   Referring Physician: Andrés Arellano MD  PCP: Re Quezada DO     Plan of care signed (Y/N): Y    Date of Patient follow up with Physician:      Plan of Care Report: EVAL   POC update due: (10 visits /OR AUTH LIMITS, whichever is less)  8/15/25                                            Medical History:  Comorbidities / Relevant Medical History: hx of BLE stress injuries                                          Precautions/ Contra-indications:           Latex allergy:  NO  Pacemaker:    NO  Contraindications for Manipulation: None  Date of Surgery: n/a  Other:    Red Flags:  None    Suicide Screening:   The patient did not verbalize a primary behavioral concern, suicidal ideation, suicidal intent, or demonstrate suicidal behaviors.    Preferred Language for Healthcare:   [x] English       [] other:    SUBJECTIVE EXAMINATION     Patient stated complaint: Pt notes she feels much better this date than she did earlier in the week. She would like to try dry needling       Test used Initial score  2025   Pain Summary VAS 4/10 0/10   Functional questionnaire LEFS 35% Deficit     Other:              OBJECTIVE EXAMINATION     7/15/25  ROM LEFT RIGHT        Knee ext     Knee Flex     DF knee bent     DF knee straight

## 2025-07-22 ENCOUNTER — HOSPITAL ENCOUNTER (OUTPATIENT)
Dept: PHYSICAL THERAPY | Age: 20
Setting detail: THERAPIES SERIES
Discharge: HOME | End: 2025-07-22
Attending: ORTHOPAEDIC SURGERY
Payer: COMMERCIAL

## 2025-07-22 PROCEDURE — 97110 THERAPEUTIC EXERCISES: CPT

## 2025-07-22 PROCEDURE — 97530 THERAPEUTIC ACTIVITIES: CPT

## 2025-07-22 NOTE — FLOWSHEET NOTE
Estim Unattended (54134)     Ther. Act (15525) 10 1  Mech. Traction (94992)     Gait (08782)    Dry Needle 1-2 muscle (82166)     Aquatic Therex (88877)    Dry Needle 3+ muscle (20561)     Iontophoresis (41419)    VASO (93874)     Ultrasound (16024)    Group Therapy (36191)     Estim Attended (98422)    Canalith Repositioning (11858)     Physical Performance Test (02760)    Custom orthotic ()     Other:    Other: cold pack 10'     Total Timed Code Tx Minutes 37        Total Treatment Minutes 40        Charge Justification:  (01996) THERAPEUTIC EXERCISE - Provided verbal/tactile cueing for HEP and/or activities related to strengthening, flexibility, endurance, ROM performed to prevent loss of range of motion, maintain or improve muscular strength or increase flexibility, following either an injury or surgery.   (20535) NEUROMUSCULAR RE-EDUCATION - Provided therapeutic procedure on activities related to neuromuscular reeducation of movement, balance, coordination, kinesthetic sense, posture, and/or proprioception for sitting and/or standing activities. Provided HEP review and/or progression.  (79548) MANUAL THERAPY -  Manual therapy techniques, 1 or more regions, each 15 minutes (Mobilization/manipulation, manual lymphatic drainage, manual traction) for the purpose of modulating pain, promoting relaxation,  increasing ROM, reducing/eliminating soft tissue swelling/inflammation/restriction, improving soft tissue extensibility and allowing for proper ROM for normal function with self care, mobility, lifting and ambulation        GOALS     Patient stated goal: return to pain free running   [] Progressing: [] Met: [] Not Met: [] Adjusted    Therapist goals for Patient:   Short Term Goals: To be achieved in: 2 weeks  Independent in HEP and progression per patient tolerance, in order to prevent re-injury.   [] Progressing: [] Met: [] Not Met: [] Adjusted  Patient will have a decrease in pain to <2/10 to facilitate

## 2025-07-25 ENCOUNTER — HOSPITAL ENCOUNTER (OUTPATIENT)
Dept: PHYSICAL THERAPY | Age: 20
Setting detail: THERAPIES SERIES
Discharge: HOME | End: 2025-07-25
Attending: ORTHOPAEDIC SURGERY
Payer: COMMERCIAL

## 2025-07-25 PROCEDURE — 97140 MANUAL THERAPY 1/> REGIONS: CPT

## 2025-07-25 PROCEDURE — 97110 THERAPEUTIC EXERCISES: CPT

## 2025-07-25 NOTE — FLOWSHEET NOTE
restriction.   [] Progressing: [] Met: [] Not Met: [] Adjusted  Patient will be able to jog 20 minutes without increased symptoms or restriction.   [] Progressing: [] Met: [] Not Met: [] Adjusted       Overall Progression Towards Functional goals/ Treatment Progress Update:  [x] Patient is progressing as expected towards functional goals listed.    [] Progression is slowed due to complexities/Impairments listed.  [] Progression has been slowed due to co-morbidities.  [] Plan just implemented, too soon (<30days) to assess goals progression   [] Goals require adjustment due to lack of progress  [] Patient is not progressing as expected and requires additional follow up with physician  [] Other:     TREATMENT PLAN     Frequency/Duration: 1-2x/week for 6-8 weeks for the following treatment interventions:    Interventions:  Therapeutic Exercise (73165) including: strength training, ROM, and functional mobility  Therapeutic Activities (32720) including: functional mobility training and education.  Neuromuscular Re-education (27228) activation and proprioception, including postural re-education.    Manual Therapy (44758) as indicated to include: Passive Range of Motion, Gr I-IV mobilizations, Soft Tissue Mobilization, Dry Needling/IASTM, Trigger Point Release, and Myofascial Release  Modalities as needed that may include: Cryotherapy and Electrical Stimulation  Patient education on joint protection, postural re-education, activity modification, and progression of HEP    Plan: progress running per janis.     Electronically Signed by Demetria Maloney PT, DPT, Board-Certified Specialist in Orthopaedics     Date: 07/25/2025   Note: Portions of this note have been templated and/or copied from initial evaluation, reassessments and prior notes for documentation efficiency.  Note: If patient does not return for scheduled/recommended follow up visits, this note will serve as a discharge from care along with the most recent update on

## 2025-07-29 ENCOUNTER — HOSPITAL ENCOUNTER (OUTPATIENT)
Dept: PHYSICAL THERAPY | Age: 20
Setting detail: THERAPIES SERIES
Discharge: HOME | End: 2025-07-29
Attending: ORTHOPAEDIC SURGERY
Payer: COMMERCIAL

## 2025-07-29 PROCEDURE — 97110 THERAPEUTIC EXERCISES: CPT

## 2025-07-29 NOTE — PLAN OF CARE
[facility] - Outpatient Rehabilitation and Therapy: [address] [place] [address] office: [phone] fax: [phone]  Physical Therapy Re-Certification Plan of Care/MD UPDATE      Dear  Dr. Arellano    We had the pleasure of treating the following patient for physical therapy services at [facility].  A summary of our findings can be found in the updated assessment below.  This includes our plan of care.  If you have any questions or concerns regarding these findings, please do not hesitate to contact me at the office phone number checked above.  Thank you for the referral.     Physician Signature:________________________________Date:__________________  By signing above (or electronic signature), therapist’s plan is approved by physician    Date Range Of Visits: 25-25  Total Visits to Date: 13  Overall Response to Treatment:   []Patient is responding well to treatment and improvement is noted with regards  to goals   []Patient should continue to improve in reasonable time if they continue HEP   []Patient has plateaued and is no longer responding to skilled PT intervention    []Patient is getting worse and would benefit from return to referring MD   []Patient unable to adhere to initial POC   []Other: Pt shows improved and maintain ROM of ankle, particularly with DF. She has been able to progress back to 10 minute jogging intervals with no recurrence of pain. We discussed continuing to gradually build volume over the next couple of weeks as she heads back to school as well as maintaining stretching/mobility exercises, foam rolling or having ATC perform IASTM to help with tissue mobility. At this time, PT will transfer care back to school ATC. She can follow up as needed when back in town.     Physical Therapy: TREATMENT/PROGRESS NOTE   Patient: Sommer Rivera (19 y.o. female)   Examination Date: 2025   :  2005 MRN: [id number]   Visit #: 13   Insurance